# Patient Record
Sex: FEMALE | Race: WHITE | NOT HISPANIC OR LATINO | Employment: UNEMPLOYED | URBAN - METROPOLITAN AREA
[De-identification: names, ages, dates, MRNs, and addresses within clinical notes are randomized per-mention and may not be internally consistent; named-entity substitution may affect disease eponyms.]

---
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
## 2021-02-23 ENCOUNTER — PATIENT MESSAGE (OUTPATIENT)
Dept: RHEUMATOLOGY | Facility: CLINIC | Age: 21
End: 2021-02-23

## 2022-10-01 NOTE — TELEPHONE ENCOUNTER
----- Message from Julia Sanches sent at 11/15/2019  2:27 PM CST -----  Contact: Patient   Patient would like another call back at [phone], Regards to her appointment she just spoke with the nurse and needs to talk back to her.    Thanks  Td    
Ronnell rc 11/15/2019  
Hide Include Location In Plan Question?: No
Detail Level: Generalized
Face Mask